# Patient Record
Sex: MALE | Race: WHITE | NOT HISPANIC OR LATINO | Employment: OTHER | URBAN - METROPOLITAN AREA
[De-identification: names, ages, dates, MRNs, and addresses within clinical notes are randomized per-mention and may not be internally consistent; named-entity substitution may affect disease eponyms.]

---

## 2018-08-08 ENCOUNTER — HOSPITAL ENCOUNTER (OUTPATIENT)
Facility: HOSPITAL | Age: 63
Setting detail: OBSERVATION
Discharge: LEFT BEFORE DISCHARGE | End: 2018-08-09
Attending: EMERGENCY MEDICINE | Admitting: INTERNAL MEDICINE

## 2018-08-08 ENCOUNTER — APPOINTMENT (OUTPATIENT)
Dept: CT IMAGING | Facility: HOSPITAL | Age: 63
End: 2018-08-08

## 2018-08-08 ENCOUNTER — APPOINTMENT (OUTPATIENT)
Dept: GENERAL RADIOLOGY | Facility: HOSPITAL | Age: 63
End: 2018-08-08

## 2018-08-08 DIAGNOSIS — N17.9 ACUTE RENAL FAILURE, UNSPECIFIED ACUTE RENAL FAILURE TYPE (HCC): ICD-10-CM

## 2018-08-08 DIAGNOSIS — R41.82 ALTERED MENTAL STATUS, UNSPECIFIED ALTERED MENTAL STATUS TYPE: Primary | ICD-10-CM

## 2018-08-08 DIAGNOSIS — G89.4 CHRONIC PAIN SYNDROME: ICD-10-CM

## 2018-08-08 DIAGNOSIS — E86.0 DEHYDRATION: ICD-10-CM

## 2018-08-08 DIAGNOSIS — D64.9 ANEMIA, UNSPECIFIED TYPE: ICD-10-CM

## 2018-08-08 DIAGNOSIS — I16.0 HYPERTENSIVE URGENCY: ICD-10-CM

## 2018-08-08 LAB
ALBUMIN SERPL-MCNC: 4.48 G/DL (ref 3.2–4.8)
ALBUMIN/GLOB SERPL: 1.2 G/DL (ref 1.5–2.5)
ALP SERPL-CCNC: 54 U/L (ref 25–100)
ALT SERPL W P-5'-P-CCNC: 13 U/L (ref 7–40)
AMPHET+METHAMPHET UR QL: NEGATIVE
AMPHETAMINES UR QL: NEGATIVE
ANION GAP SERPL CALCULATED.3IONS-SCNC: 10 MMOL/L (ref 3–11)
AST SERPL-CCNC: 19 U/L (ref 0–33)
BARBITURATES UR QL SCN: NEGATIVE
BASOPHILS # BLD AUTO: 0.05 10*3/MM3 (ref 0–0.2)
BASOPHILS NFR BLD AUTO: 0.6 % (ref 0–1)
BENZODIAZ UR QL SCN: NEGATIVE
BILIRUB SERPL-MCNC: 0.4 MG/DL (ref 0.3–1.2)
BILIRUB UR QL STRIP: NEGATIVE
BNP SERPL-MCNC: 141 PG/ML (ref 0–100)
BUN BLD-MCNC: 20 MG/DL (ref 9–23)
BUN/CREAT SERPL: 9.6 (ref 7–25)
BUPRENORPHINE SERPL-MCNC: NEGATIVE NG/ML
CALCIUM SPEC-SCNC: 10 MG/DL (ref 8.7–10.4)
CANNABINOIDS SERPL QL: NEGATIVE
CHLORIDE SERPL-SCNC: 113 MMOL/L (ref 99–109)
CLARITY UR: CLEAR
CO2 SERPL-SCNC: 23 MMOL/L (ref 20–31)
COCAINE UR QL: NEGATIVE
COLOR UR: YELLOW
CREAT BLD-MCNC: 2.08 MG/DL (ref 0.6–1.3)
DEPRECATED RDW RBC AUTO: 45.5 FL (ref 37–54)
EOSINOPHIL # BLD AUTO: 0.18 10*3/MM3 (ref 0–0.3)
EOSINOPHIL NFR BLD AUTO: 2 % (ref 0–3)
ERYTHROCYTE [DISTWIDTH] IN BLOOD BY AUTOMATED COUNT: 13.8 % (ref 11.3–14.5)
GFR SERPL CREATININE-BSD FRML MDRD: 32 ML/MIN/1.73
GLOBULIN UR ELPH-MCNC: 3.7 GM/DL
GLUCOSE BLD-MCNC: 85 MG/DL (ref 70–100)
GLUCOSE BLDC GLUCOMTR-MCNC: 86 MG/DL (ref 70–130)
GLUCOSE UR STRIP-MCNC: NEGATIVE MG/DL
HCT VFR BLD AUTO: 31.5 % (ref 38.9–50.9)
HGB BLD-MCNC: 10 G/DL (ref 13.1–17.5)
HGB UR QL STRIP.AUTO: NEGATIVE
HOLD SPECIMEN: NORMAL
HOLD SPECIMEN: NORMAL
IMM GRANULOCYTES # BLD: 0.02 10*3/MM3 (ref 0–0.03)
IMM GRANULOCYTES NFR BLD: 0.2 % (ref 0–0.6)
KETONES UR QL STRIP: NEGATIVE
LEUKOCYTE ESTERASE UR QL STRIP.AUTO: NEGATIVE
LYMPHOCYTES # BLD AUTO: 2.64 10*3/MM3 (ref 0.6–4.8)
LYMPHOCYTES NFR BLD AUTO: 29.1 % (ref 24–44)
MAGNESIUM SERPL-MCNC: 1.8 MG/DL (ref 1.3–2.7)
MCH RBC QN AUTO: 28.3 PG (ref 27–31)
MCHC RBC AUTO-ENTMCNC: 31.7 G/DL (ref 32–36)
MCV RBC AUTO: 89.2 FL (ref 80–99)
METHADONE UR QL SCN: POSITIVE
MONOCYTES # BLD AUTO: 0.8 10*3/MM3 (ref 0–1)
MONOCYTES NFR BLD AUTO: 8.8 % (ref 0–12)
NEUTROPHILS # BLD AUTO: 5.39 10*3/MM3 (ref 1.5–8.3)
NEUTROPHILS NFR BLD AUTO: 59.3 % (ref 41–71)
NITRITE UR QL STRIP: NEGATIVE
OPIATES UR QL: NEGATIVE
OXYCODONE UR QL SCN: POSITIVE
PCP UR QL SCN: NEGATIVE
PH UR STRIP.AUTO: <=5 [PH] (ref 5–8)
PLATELET # BLD AUTO: 196 10*3/MM3 (ref 150–450)
PMV BLD AUTO: 9.7 FL (ref 6–12)
POTASSIUM BLD-SCNC: 4.1 MMOL/L (ref 3.5–5.5)
PROPOXYPH UR QL: NEGATIVE
PROT SERPL-MCNC: 8.2 G/DL (ref 5.7–8.2)
PROT UR QL STRIP: NEGATIVE
RBC # BLD AUTO: 3.53 10*6/MM3 (ref 4.2–5.76)
SODIUM BLD-SCNC: 146 MMOL/L (ref 132–146)
SP GR UR STRIP: 1.01 (ref 1–1.03)
TRICYCLICS UR QL SCN: NEGATIVE
TROPONIN I SERPL-MCNC: 0 NG/ML (ref 0–0.07)
TROPONIN I SERPL-MCNC: 0 NG/ML (ref 0–0.07)
UROBILINOGEN UR QL STRIP: NORMAL
WBC NRBC COR # BLD: 9.08 10*3/MM3 (ref 3.5–10.8)
WHOLE BLOOD HOLD SPECIMEN: NORMAL
WHOLE BLOOD HOLD SPECIMEN: NORMAL

## 2018-08-08 PROCEDURE — 85025 COMPLETE CBC W/AUTO DIFF WBC: CPT

## 2018-08-08 PROCEDURE — 93005 ELECTROCARDIOGRAM TRACING: CPT | Performed by: EMERGENCY MEDICINE

## 2018-08-08 PROCEDURE — 80306 DRUG TEST PRSMV INSTRMNT: CPT | Performed by: PHYSICIAN ASSISTANT

## 2018-08-08 PROCEDURE — 93005 ELECTROCARDIOGRAM TRACING: CPT | Performed by: PHYSICIAN ASSISTANT

## 2018-08-08 PROCEDURE — 71045 X-RAY EXAM CHEST 1 VIEW: CPT

## 2018-08-08 PROCEDURE — 99285 EMERGENCY DEPT VISIT HI MDM: CPT

## 2018-08-08 PROCEDURE — 96374 THER/PROPH/DIAG INJ IV PUSH: CPT

## 2018-08-08 PROCEDURE — 82962 GLUCOSE BLOOD TEST: CPT

## 2018-08-08 PROCEDURE — 25010000002 HYDRALAZINE PER 20 MG: Performed by: PHYSICIAN ASSISTANT

## 2018-08-08 PROCEDURE — 84484 ASSAY OF TROPONIN QUANT: CPT

## 2018-08-08 PROCEDURE — 81003 URINALYSIS AUTO W/O SCOPE: CPT | Performed by: PHYSICIAN ASSISTANT

## 2018-08-08 PROCEDURE — 80053 COMPREHEN METABOLIC PANEL: CPT

## 2018-08-08 PROCEDURE — 96361 HYDRATE IV INFUSION ADD-ON: CPT

## 2018-08-08 PROCEDURE — 83735 ASSAY OF MAGNESIUM: CPT

## 2018-08-08 PROCEDURE — 93005 ELECTROCARDIOGRAM TRACING: CPT

## 2018-08-08 PROCEDURE — 70450 CT HEAD/BRAIN W/O DYE: CPT

## 2018-08-08 PROCEDURE — 83880 ASSAY OF NATRIURETIC PEPTIDE: CPT | Performed by: PHYSICIAN ASSISTANT

## 2018-08-08 RX ORDER — METHADONE HYDROCHLORIDE 10 MG/1
10 TABLET ORAL EVERY 6 HOURS PRN
COMMUNITY

## 2018-08-08 RX ORDER — SODIUM CHLORIDE 0.9 % (FLUSH) 0.9 %
10 SYRINGE (ML) INJECTION AS NEEDED
Status: DISCONTINUED | OUTPATIENT
Start: 2018-08-08 | End: 2018-08-09 | Stop reason: HOSPADM

## 2018-08-08 RX ORDER — LISINOPRIL 20 MG/1
20 TABLET ORAL DAILY
COMMUNITY

## 2018-08-08 RX ORDER — HYDRALAZINE HYDROCHLORIDE 20 MG/ML
10 INJECTION INTRAMUSCULAR; INTRAVENOUS ONCE
Status: COMPLETED | OUTPATIENT
Start: 2018-08-08 | End: 2018-08-08

## 2018-08-08 RX ADMIN — SODIUM CHLORIDE 1000 ML: 9 INJECTION, SOLUTION INTRAVENOUS at 22:00

## 2018-08-08 RX ADMIN — HYDRALAZINE HYDROCHLORIDE 10 MG: 20 INJECTION INTRAMUSCULAR; INTRAVENOUS at 23:55
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
## 2018-08-09 ENCOUNTER — APPOINTMENT (OUTPATIENT)
Dept: MRI IMAGING | Facility: HOSPITAL | Age: 63
End: 2018-08-09

## 2018-08-09 ENCOUNTER — APPOINTMENT (OUTPATIENT)
Dept: GENERAL RADIOLOGY | Facility: HOSPITAL | Age: 63
End: 2018-08-09

## 2018-08-09 VITALS
DIASTOLIC BLOOD PRESSURE: 96 MMHG | RESPIRATION RATE: 18 BRPM | WEIGHT: 174 LBS | HEART RATE: 103 BPM | BODY MASS INDEX: 23.57 KG/M2 | HEIGHT: 72 IN | OXYGEN SATURATION: 95 % | SYSTOLIC BLOOD PRESSURE: 191 MMHG | TEMPERATURE: 98.6 F

## 2018-08-09 PROBLEM — F10.10 ALCOHOL ABUSE: Status: ACTIVE | Noted: 2018-08-09

## 2018-08-09 PROBLEM — F11.90 CHRONIC NARCOTIC USE: Status: ACTIVE | Noted: 2018-08-09

## 2018-08-09 PROBLEM — I10 HTN (HYPERTENSION): Status: ACTIVE | Noted: 2018-08-09

## 2018-08-09 PROBLEM — R41.82 ALTERED MENTAL STATUS: Status: ACTIVE | Noted: 2018-08-09

## 2018-08-09 PROBLEM — G93.40 ENCEPHALOPATHY: Status: ACTIVE | Noted: 2018-08-09

## 2018-08-09 PROBLEM — Z72.0 TOBACCO ABUSE: Status: ACTIVE | Noted: 2018-08-09

## 2018-08-09 PROBLEM — N17.9 ARF (ACUTE RENAL FAILURE) (HCC): Status: ACTIVE | Noted: 2018-08-09

## 2018-08-09 LAB
ANION GAP SERPL CALCULATED.3IONS-SCNC: 8 MMOL/L (ref 3–11)
BUN BLD-MCNC: 19 MG/DL (ref 9–23)
BUN/CREAT SERPL: 10.2 (ref 7–25)
CALCIUM SPEC-SCNC: 9.2 MG/DL (ref 8.7–10.4)
CHLORIDE SERPL-SCNC: 115 MMOL/L (ref 99–109)
CO2 SERPL-SCNC: 22 MMOL/L (ref 20–31)
CREAT BLD-MCNC: 1.86 MG/DL (ref 0.6–1.3)
GFR SERPL CREATININE-BSD FRML MDRD: 37 ML/MIN/1.73
GLUCOSE BLD-MCNC: 82 MG/DL (ref 70–100)
POTASSIUM BLD-SCNC: 4.1 MMOL/L (ref 3.5–5.5)
SODIUM BLD-SCNC: 145 MMOL/L (ref 132–146)
TSH SERPL DL<=0.05 MIU/L-ACNC: 1.17 MIU/ML (ref 0.35–5.35)

## 2018-08-09 PROCEDURE — 84443 ASSAY THYROID STIM HORMONE: CPT | Performed by: INTERNAL MEDICINE

## 2018-08-09 PROCEDURE — 70551 MRI BRAIN STEM W/O DYE: CPT

## 2018-08-09 PROCEDURE — G0378 HOSPITAL OBSERVATION PER HR: HCPCS

## 2018-08-09 PROCEDURE — 80048 BASIC METABOLIC PNL TOTAL CA: CPT | Performed by: INTERNAL MEDICINE

## 2018-08-09 PROCEDURE — 99220 PR INITIAL OBSERVATION CARE/DAY 70 MINUTES: CPT | Performed by: INTERNAL MEDICINE

## 2018-08-09 PROCEDURE — 74018 RADEX ABDOMEN 1 VIEW: CPT

## 2018-08-09 RX ORDER — CLOPIDOGREL BISULFATE 75 MG/1
75 TABLET ORAL DAILY
Status: ON HOLD | COMMUNITY
End: 2019-09-24

## 2018-08-09 RX ORDER — HEPARIN SODIUM 5000 [USP'U]/ML
5000 INJECTION, SOLUTION INTRAVENOUS; SUBCUTANEOUS EVERY 8 HOURS SCHEDULED
Status: DISCONTINUED | OUTPATIENT
Start: 2018-08-09 | End: 2018-08-09 | Stop reason: HOSPADM

## 2018-08-09 RX ORDER — RANITIDINE 150 MG/1
300 TABLET ORAL DAILY
COMMUNITY
End: 2019-09-23

## 2018-08-09 RX ORDER — SODIUM CHLORIDE 9 MG/ML
100 INJECTION, SOLUTION INTRAVENOUS CONTINUOUS
Status: DISCONTINUED | OUTPATIENT
Start: 2018-08-09 | End: 2018-08-09

## 2018-08-09 RX ORDER — SODIUM CHLORIDE 0.9 % (FLUSH) 0.9 %
1-10 SYRINGE (ML) INJECTION AS NEEDED
Status: DISCONTINUED | OUTPATIENT
Start: 2018-08-09 | End: 2018-08-09 | Stop reason: HOSPADM

## 2018-08-09 RX ORDER — METHADONE HYDROCHLORIDE 10 MG/1
10 TABLET ORAL EVERY 6 HOURS PRN
Status: DISCONTINUED | OUTPATIENT
Start: 2018-08-09 | End: 2018-08-09 | Stop reason: HOSPADM

## 2018-08-09 RX ORDER — OXYCODONE AND ACETAMINOPHEN 10; 325 MG/1; MG/1
1 TABLET ORAL EVERY 8 HOURS PRN
COMMUNITY

## 2018-08-09 RX ORDER — TAMSULOSIN HYDROCHLORIDE 0.4 MG/1
1 CAPSULE ORAL NIGHTLY
COMMUNITY

## 2018-08-09 RX ADMIN — METOPROLOL TARTRATE 12.5 MG: 25 TABLET, FILM COATED ORAL at 09:40

## 2018-08-09 NOTE — PROGRESS NOTES
Mr. Elizabeth was seen and examined in his room today. H&P was reviewed from overnight. Patient's ROBLES was also reviewed which documents filling methadone 10mg q4h for a 30 day supply on 8/3/18. Given this, will restart methadone. Patient also noted to have filled klonopin and oxy/APAP both for 30 day supplies on 8/3, will hold these medications at this time as patient still with encephalopathy. Supsect he is withdrawing at this time and will place him on [facility] protocol for monitoring of this withdraw. Of note, patient appears dry however is refusing IVF at this time, so will discontinue. Will continue to monitor kidney function with daily BMP.

## 2018-08-09 NOTE — PROGRESS NOTES
"Discharge Planning Assessment  [facility]     Patient Name: Patrick Elizabeth  MRN: [id number]  Today's Date: 8/9/2018    Admit Date: 8/8/2018          Discharge Needs Assessment     Row Name 08/09/18 1214       Living Environment    Lives With alone    Current Living Arrangements home/apartment/condo    Primary Care Provided by self    Provides Primary Care For no one, unable/limited ability to care for self    Family Caregiver if Needed none    Quality of Family Relationships unable to assess    Able to Return to Prior Arrangements other (see comments)       Resource/Environmental Concerns    Resource/Environmental Concerns other (see comments)    Transportation Concerns car, none       Transition Planning    Patient/Family Anticipates Transition to home    Patient/Family Anticipated Services at Transition none    Transportation Anticipated family or friend will provide       Discharge Needs Assessment    Readmission Within the Last 30 Days no previous admission in last 30 days    Concerns to be Addressed discharge planning    Equipment Currently Used at Home none    Anticipated Changes Related to Illness none    Equipment Needed After Discharge none    Discharge Facility/Level of Care Needs home with home health    Offered/Gave Vendor List yes            Discharge Plan     Row Name 08/09/18 1210       Plan    Plan IDP    Patient/Family in Agreement with Plan yes    Plan Comments Pt is confused during IDP. No family or friends present in room. IDP information gather from pt. Pt lives in [facility] and states he has friends to bring him home at d/c.  However, he does not have a phone number listed for EC. When asked for a EC phone number he stated \"its in my phone\". Pt states he uses no HH, PT/OT, or DME.  Pt states his PCP is Dr Asad Hudson and he sees Niki Chandler for pain control. Pt states his medications are covered by insurance but didn't know if he had plan D w Medicare.  Pt has been threatning to leave " "AMA per nursing staff. Pt may need HH at d/c. He stated he didn't want this. CM went over a list of HH companies. Pt did not respond. SW is aware of pt. Pt goal is to return home w friends (may need to use alternate transportation) possibly w HH if he agrees and it is medically needed. CM will continue to follow.        Destination     No service coordination in this encounter.      Durable Medical Equipment     No service coordination in this encounter.      Dialysis/Infusion     No service coordination in this encounter.      Home Medical Care     No service coordination in this encounter.      Social Care     No service coordination in this encounter.        Expected Discharge Date and Time     Expected Discharge Date Expected Discharge Time    Aug 11, 2018               Demographic Summary     Row Name 08/09/18 1212       General Information    Admission Type observation    Arrived From home    Referral Source admission list    Reason for Consult discharge planning    Preferred Language English       Contact Information    Permission Granted to Share Info With ;family/designee    Contact Information Comments Pt has no listed EC. When asked he stated he had \"friends to call\" but didn't have numbers.             Functional Status     Row Name 08/09/18 1213       Functional Status    Usual Activity Tolerance moderate       Functional Status, IADL    Medications independent    Meal Preparation independent    Housekeeping independent    Laundry independent    Shopping independent       Mental Status Summary    Recent Changes in Mental Status/Cognitive Functioning unable to assess            Psychosocial    No documentation.           Abuse/Neglect    No documentation.           Legal    No documentation.           Substance Abuse    No documentation.           Patient Forms    No documentation.         Lauren Alanis RN    "

## 2018-08-09 NOTE — NURSING NOTE
"Patient came up to nursing station asking to leave against medical advice. This nurse educated patient on needing to stay due to uncontrolled hypertension, patient stated he needed to leave he \" needs his pills\" and \"we werent giving him any\". Patient signed paperwork and stated he was going to wait downstairs for a ride. Patient left in stable condition. Primary RN Samia notified and MD Patterson notified in rounds that patient may leave AMA.   "
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
## 2019-09-12 ENCOUNTER — OFFICE VISIT (OUTPATIENT)
Dept: CARDIAC SURGERY | Facility: CLINIC | Age: 64
End: 2019-09-12

## 2019-09-12 VITALS
BODY MASS INDEX: 23.57 KG/M2 | HEIGHT: 72 IN | WEIGHT: 174 LBS | OXYGEN SATURATION: 98 % | SYSTOLIC BLOOD PRESSURE: 140 MMHG | DIASTOLIC BLOOD PRESSURE: 70 MMHG | HEART RATE: 105 BPM

## 2019-09-12 DIAGNOSIS — I73.9 PERIPHERAL ARTERIAL DISEASE (HCC): Primary | ICD-10-CM

## 2019-09-12 PROCEDURE — 99205 OFFICE O/P NEW HI 60 MIN: CPT | Performed by: THORACIC SURGERY (CARDIOTHORACIC VASCULAR SURGERY)

## 2019-09-12 RX ORDER — PRAVASTATIN SODIUM 20 MG
20 TABLET ORAL DAILY
COMMUNITY
Start: 2019-07-29

## 2019-09-12 RX ORDER — CLONAZEPAM 1 MG/1
TABLET ORAL 3 TIMES DAILY
COMMUNITY
Start: 2018-06-28
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
## 2019-09-13 DIAGNOSIS — I70.219 ATHEROSCLEROTIC PVD WITH INTERMITTENT CLAUDICATION (HCC): Primary | ICD-10-CM

## 2019-09-13 PROBLEM — I73.9 PERIPHERAL ARTERIAL DISEASE (HCC): Status: ACTIVE | Noted: 2019-09-13

## 2019-09-16 ENCOUNTER — PREP FOR SURGERY (OUTPATIENT)
Dept: OTHER | Facility: HOSPITAL | Age: 64
End: 2019-09-16

## 2019-09-16 DIAGNOSIS — I73.9 PVD (PERIPHERAL VASCULAR DISEASE) (HCC): Primary | ICD-10-CM

## 2019-09-23 ENCOUNTER — ANESTHESIA EVENT (OUTPATIENT)
Dept: PERIOP | Facility: HOSPITAL | Age: 64
End: 2019-09-23

## 2019-09-23 ENCOUNTER — APPOINTMENT (OUTPATIENT)
Dept: PREADMISSION TESTING | Facility: HOSPITAL | Age: 64
End: 2019-09-23

## 2019-09-23 VITALS — WEIGHT: 175.6 LBS | BODY MASS INDEX: 23.78 KG/M2 | HEIGHT: 72 IN

## 2019-09-23 DIAGNOSIS — I73.9 PVD (PERIPHERAL VASCULAR DISEASE) (HCC): ICD-10-CM

## 2019-09-23 LAB
ANION GAP SERPL CALCULATED.3IONS-SCNC: 12 MMOL/L (ref 5–15)
APTT PPP: 35.3 SECONDS (ref 24–37)
BUN BLD-MCNC: 16 MG/DL (ref 8–23)
BUN/CREAT SERPL: 15.5 (ref 7–25)
CALCIUM SPEC-SCNC: 9.8 MG/DL (ref 8.6–10.5)
CHLORIDE SERPL-SCNC: 101 MMOL/L (ref 98–107)
CO2 SERPL-SCNC: 24 MMOL/L (ref 22–29)
CREAT BLD-MCNC: 1.03 MG/DL (ref 0.76–1.27)
DEPRECATED RDW RBC AUTO: 47.7 FL (ref 37–54)
ERYTHROCYTE [DISTWIDTH] IN BLOOD BY AUTOMATED COUNT: 13.9 % (ref 12.3–15.4)
GFR SERPL CREATININE-BSD FRML MDRD: 72 ML/MIN/1.73
GLUCOSE BLD-MCNC: 104 MG/DL (ref 65–99)
HBA1C MFR BLD: 5.2 % (ref 4.8–5.6)
HCT VFR BLD AUTO: 35.6 % (ref 37.5–51)
HGB BLD-MCNC: 11.3 G/DL (ref 13–17.7)
INR PPP: 1.12 (ref 0.85–1.16)
MCH RBC QN AUTO: 29.4 PG (ref 26.6–33)
MCHC RBC AUTO-ENTMCNC: 31.7 G/DL (ref 31.5–35.7)
MCV RBC AUTO: 92.5 FL (ref 79–97)
PLATELET # BLD AUTO: 242 10*3/MM3 (ref 140–450)
PMV BLD AUTO: 9.8 FL (ref 6–12)
POTASSIUM BLD-SCNC: 4.8 MMOL/L (ref 3.5–5.2)
PROTHROMBIN TIME: 13.9 SECONDS (ref 11.2–14.3)
RBC # BLD AUTO: 3.85 10*6/MM3 (ref 4.14–5.8)
SODIUM BLD-SCNC: 137 MMOL/L (ref 136–145)
WBC NRBC COR # BLD: 9.23 10*3/MM3 (ref 3.4–10.8)

## 2019-09-23 PROCEDURE — 85027 COMPLETE CBC AUTOMATED: CPT | Performed by: PHYSICIAN ASSISTANT

## 2019-09-23 PROCEDURE — 80048 BASIC METABOLIC PNL TOTAL CA: CPT | Performed by: PHYSICIAN ASSISTANT

## 2019-09-23 PROCEDURE — 85610 PROTHROMBIN TIME: CPT | Performed by: PHYSICIAN ASSISTANT

## 2019-09-23 PROCEDURE — 36415 COLL VENOUS BLD VENIPUNCTURE: CPT

## 2019-09-23 PROCEDURE — 85730 THROMBOPLASTIN TIME PARTIAL: CPT | Performed by: PHYSICIAN ASSISTANT

## 2019-09-23 PROCEDURE — 83036 HEMOGLOBIN GLYCOSYLATED A1C: CPT | Performed by: PHYSICIAN ASSISTANT

## 2019-09-23 RX ORDER — FAMOTIDINE 10 MG/ML
20 INJECTION, SOLUTION INTRAVENOUS ONCE
Status: CANCELLED | OUTPATIENT
Start: 2019-09-23 | End: 2019-09-23

## 2019-09-23 NOTE — PAT
Patient to apply Chlorhexadine wipes  to surgical area (as instructed) the night before procedure and the AM of procedure. Wipes provided.    Per Anesthesia Request, patient instructed not to take their ACE/ARB medications on the AM of surgery.

## 2019-09-24 ENCOUNTER — APPOINTMENT (OUTPATIENT)
Dept: GENERAL RADIOLOGY | Facility: HOSPITAL | Age: 64
End: 2019-09-24

## 2019-09-24 ENCOUNTER — HOSPITAL ENCOUNTER (OUTPATIENT)
Facility: HOSPITAL | Age: 64
Discharge: HOME | End: 2019-09-24
Attending: THORACIC SURGERY (CARDIOTHORACIC VASCULAR SURGERY) | Admitting: THORACIC SURGERY (CARDIOTHORACIC VASCULAR SURGERY)

## 2019-09-24 ENCOUNTER — ANESTHESIA (OUTPATIENT)
Dept: PERIOP | Facility: HOSPITAL | Age: 64
End: 2019-09-24

## 2019-09-24 VITALS
TEMPERATURE: 97.8 F | SYSTOLIC BLOOD PRESSURE: 140 MMHG | RESPIRATION RATE: 18 BRPM | HEART RATE: 105 BPM | DIASTOLIC BLOOD PRESSURE: 71 MMHG | OXYGEN SATURATION: 95 %

## 2019-09-24 DIAGNOSIS — I73.9 PVD (PERIPHERAL VASCULAR DISEASE) (HCC): ICD-10-CM

## 2019-09-24 PROCEDURE — 75716 ARTERY X-RAYS ARMS/LEGS: CPT | Performed by: THORACIC SURGERY (CARDIOTHORACIC VASCULAR SURGERY)

## 2019-09-24 PROCEDURE — 25010000002 HEPARIN (PORCINE) PER 1000 UNITS: Performed by: THORACIC SURGERY (CARDIOTHORACIC VASCULAR SURGERY)

## 2019-09-24 PROCEDURE — C1769 GUIDE WIRE: HCPCS | Performed by: THORACIC SURGERY (CARDIOTHORACIC VASCULAR SURGERY)

## 2019-09-24 PROCEDURE — 25010000002 FENTANYL CITRATE (PF) 100 MCG/2ML SOLUTION: Performed by: NURSE ANESTHETIST, CERTIFIED REGISTERED

## 2019-09-24 PROCEDURE — A9270 NON-COVERED ITEM OR SERVICE: HCPCS | Performed by: THORACIC SURGERY (CARDIOTHORACIC VASCULAR SURGERY)

## 2019-09-24 PROCEDURE — C1887 CATHETER, GUIDING: HCPCS | Performed by: THORACIC SURGERY (CARDIOTHORACIC VASCULAR SURGERY)

## 2019-09-24 PROCEDURE — 25010000002 PROPOFOL 10 MG/ML EMULSION: Performed by: NURSE ANESTHETIST, CERTIFIED REGISTERED

## 2019-09-24 PROCEDURE — 36200 PLACE CATHETER IN AORTA: CPT | Performed by: THORACIC SURGERY (CARDIOTHORACIC VASCULAR SURGERY)

## 2019-09-24 PROCEDURE — 25010000003 LIDOCAINE 1 % SOLUTION: Performed by: THORACIC SURGERY (CARDIOTHORACIC VASCULAR SURGERY)

## 2019-09-24 PROCEDURE — 25010000002 HYDRALAZINE PER 20 MG: Performed by: NURSE ANESTHETIST, CERTIFIED REGISTERED

## 2019-09-24 PROCEDURE — 75605 CONTRAST EXAM THORACIC AORTA: CPT

## 2019-09-24 PROCEDURE — 75716 ARTERY X-RAYS ARMS/LEGS: CPT

## 2019-09-24 PROCEDURE — 63710000001 NICOTINE 14 MG/24HR PATCH 24 HOUR: Performed by: THORACIC SURGERY (CARDIOTHORACIC VASCULAR SURGERY)

## 2019-09-24 PROCEDURE — 0 IODIXANOL PER 1 ML: Performed by: THORACIC SURGERY (CARDIOTHORACIC VASCULAR SURGERY)

## 2019-09-24 PROCEDURE — C1894 INTRO/SHEATH, NON-LASER: HCPCS | Performed by: THORACIC SURGERY (CARDIOTHORACIC VASCULAR SURGERY)

## 2019-09-24 PROCEDURE — 25010000002 ONDANSETRON PER 1 MG: Performed by: NURSE ANESTHETIST, CERTIFIED REGISTERED

## 2019-09-24 PROCEDURE — 75625 CONTRAST EXAM ABDOMINL AORTA: CPT

## 2019-09-24 PROCEDURE — 75625 CONTRAST EXAM ABDOMINL AORTA: CPT | Performed by: THORACIC SURGERY (CARDIOTHORACIC VASCULAR SURGERY)

## 2019-09-24 RX ORDER — PROPOFOL 10 MG/ML
VIAL (ML) INTRAVENOUS AS NEEDED
Status: DISCONTINUED | OUTPATIENT
Start: 2019-09-24 | End: 2019-09-24 | Stop reason: SURG

## 2019-09-24 RX ORDER — MEPERIDINE HYDROCHLORIDE 25 MG/ML
12.5 INJECTION INTRAMUSCULAR; INTRAVENOUS; SUBCUTANEOUS
Status: DISCONTINUED | OUTPATIENT
Start: 2019-09-24 | End: 2019-09-24 | Stop reason: HOSPADM

## 2019-09-24 RX ORDER — IPRATROPIUM BROMIDE AND ALBUTEROL SULFATE 2.5; .5 MG/3ML; MG/3ML
3 SOLUTION RESPIRATORY (INHALATION) ONCE AS NEEDED
Status: DISCONTINUED | OUTPATIENT
Start: 2019-09-24 | End: 2019-09-24 | Stop reason: HOSPADM

## 2019-09-24 RX ORDER — SODIUM CHLORIDE 0.9 % (FLUSH) 0.9 %
3 SYRINGE (ML) INJECTION EVERY 12 HOURS SCHEDULED
Status: DISCONTINUED | OUTPATIENT
Start: 2019-09-24 | End: 2019-09-24 | Stop reason: HOSPADM

## 2019-09-24 RX ORDER — LIDOCAINE HYDROCHLORIDE 10 MG/ML
0.5 INJECTION, SOLUTION EPIDURAL; INFILTRATION; INTRACAUDAL; PERINEURAL ONCE AS NEEDED
Status: COMPLETED | OUTPATIENT
Start: 2019-09-24 | End: 2019-09-24

## 2019-09-24 RX ORDER — SODIUM CHLORIDE 450 MG/100ML
100 INJECTION, SOLUTION INTRAVENOUS CONTINUOUS
Status: CANCELLED | OUTPATIENT
Start: 2019-09-24

## 2019-09-24 RX ORDER — FENTANYL CITRATE 50 UG/ML
INJECTION, SOLUTION INTRAMUSCULAR; INTRAVENOUS AS NEEDED
Status: DISCONTINUED | OUTPATIENT
Start: 2019-09-24 | End: 2019-09-24 | Stop reason: SURG

## 2019-09-24 RX ORDER — PROMETHAZINE HYDROCHLORIDE 25 MG/1
25 TABLET ORAL ONCE AS NEEDED
Status: DISCONTINUED | OUTPATIENT
Start: 2019-09-24 | End: 2019-09-24 | Stop reason: HOSPADM

## 2019-09-24 RX ORDER — HYDROMORPHONE HYDROCHLORIDE 1 MG/ML
0.5 INJECTION, SOLUTION INTRAMUSCULAR; INTRAVENOUS; SUBCUTANEOUS
Status: DISCONTINUED | OUTPATIENT
Start: 2019-09-24 | End: 2019-09-24 | Stop reason: HOSPADM

## 2019-09-24 RX ORDER — HYDRALAZINE HYDROCHLORIDE 20 MG/ML
5 INJECTION INTRAMUSCULAR; INTRAVENOUS
Status: DISCONTINUED | OUTPATIENT
Start: 2019-09-24 | End: 2019-09-24 | Stop reason: HOSPADM

## 2019-09-24 RX ORDER — PROMETHAZINE HYDROCHLORIDE 25 MG/ML
6.25 INJECTION, SOLUTION INTRAMUSCULAR; INTRAVENOUS ONCE AS NEEDED
Status: DISCONTINUED | OUTPATIENT
Start: 2019-09-24 | End: 2019-09-24 | Stop reason: HOSPADM

## 2019-09-24 RX ORDER — NICOTINE 21 MG/24HR
1 PATCH, TRANSDERMAL 24 HOURS TRANSDERMAL ONCE
Status: DISCONTINUED | OUTPATIENT
Start: 2019-09-24 | End: 2019-09-24 | Stop reason: HOSPADM

## 2019-09-24 RX ORDER — IODIXANOL 320 MG/ML
INJECTION, SOLUTION INTRAVASCULAR AS NEEDED
Status: DISCONTINUED | OUTPATIENT
Start: 2019-09-24 | End: 2019-09-24 | Stop reason: HOSPADM

## 2019-09-24 RX ORDER — SODIUM CHLORIDE 0.9 % (FLUSH) 0.9 %
3-10 SYRINGE (ML) INJECTION AS NEEDED
Status: DISCONTINUED | OUTPATIENT
Start: 2019-09-24 | End: 2019-09-24 | Stop reason: HOSPADM

## 2019-09-24 RX ORDER — FENTANYL CITRATE 50 UG/ML
50 INJECTION, SOLUTION INTRAMUSCULAR; INTRAVENOUS
Status: DISCONTINUED | OUTPATIENT
Start: 2019-09-24 | End: 2019-09-24 | Stop reason: HOSPADM

## 2019-09-24 RX ORDER — LABETALOL HYDROCHLORIDE 5 MG/ML
5 INJECTION, SOLUTION INTRAVENOUS
Status: DISCONTINUED | OUTPATIENT
Start: 2019-09-24 | End: 2019-09-24 | Stop reason: HOSPADM

## 2019-09-24 RX ORDER — LIDOCAINE HYDROCHLORIDE 10 MG/ML
INJECTION, SOLUTION INFILTRATION; PERINEURAL AS NEEDED
Status: DISCONTINUED | OUTPATIENT
Start: 2019-09-24 | End: 2019-09-24 | Stop reason: HOSPADM

## 2019-09-24 RX ORDER — ONDANSETRON 2 MG/ML
4 INJECTION INTRAMUSCULAR; INTRAVENOUS ONCE AS NEEDED
Status: COMPLETED | OUTPATIENT
Start: 2019-09-24 | End: 2019-09-24

## 2019-09-24 RX ORDER — HYDROCODONE BITARTRATE AND ACETAMINOPHEN 5; 325 MG/1; MG/1
1 TABLET ORAL ONCE AS NEEDED
Status: DISCONTINUED | OUTPATIENT
Start: 2019-09-24 | End: 2019-09-24 | Stop reason: HOSPADM

## 2019-09-24 RX ORDER — FAMOTIDINE 20 MG/1
20 TABLET, FILM COATED ORAL ONCE
Status: COMPLETED | OUTPATIENT
Start: 2019-09-24 | End: 2019-09-24

## 2019-09-24 RX ORDER — SODIUM CHLORIDE, SODIUM LACTATE, POTASSIUM CHLORIDE, CALCIUM CHLORIDE 600; 310; 30; 20 MG/100ML; MG/100ML; MG/100ML; MG/100ML
9 INJECTION, SOLUTION INTRAVENOUS CONTINUOUS
Status: DISCONTINUED | OUTPATIENT
Start: 2019-09-24 | End: 2019-09-24 | Stop reason: HOSPADM

## 2019-09-24 RX ORDER — NALOXONE HCL 0.4 MG/ML
0.4 VIAL (ML) INJECTION AS NEEDED
Status: DISCONTINUED | OUTPATIENT
Start: 2019-09-24 | End: 2019-09-24 | Stop reason: HOSPADM

## 2019-09-24 RX ORDER — PROMETHAZINE HYDROCHLORIDE 25 MG/1
25 SUPPOSITORY RECTAL ONCE AS NEEDED
Status: DISCONTINUED | OUTPATIENT
Start: 2019-09-24 | End: 2019-09-24 | Stop reason: HOSPADM

## 2019-09-24 RX ADMIN — HYDRALAZINE HYDROCHLORIDE 5 MG: 20 INJECTION INTRAMUSCULAR; INTRAVENOUS at 11:06

## 2019-09-24 RX ADMIN — NICOTINE 1 PATCH: 14 PATCH, EXTENDED RELEASE TRANSDERMAL at 14:03

## 2019-09-24 RX ADMIN — ONDANSETRON 4 MG: 2 INJECTION INTRAMUSCULAR; INTRAVENOUS at 11:46

## 2019-09-24 RX ADMIN — PROPOFOL 100 MCG/KG/MIN: 10 INJECTION, EMULSION INTRAVENOUS at 09:45

## 2019-09-24 RX ADMIN — FENTANYL CITRATE 50 MCG: 50 INJECTION, SOLUTION INTRAMUSCULAR; INTRAVENOUS at 09:45

## 2019-09-24 RX ADMIN — FENTANYL CITRATE 25 MCG: 50 INJECTION, SOLUTION INTRAMUSCULAR; INTRAVENOUS at 09:49

## 2019-09-24 RX ADMIN — HYDRALAZINE HYDROCHLORIDE 5 MG: 20 INJECTION INTRAMUSCULAR; INTRAVENOUS at 10:53

## 2019-09-24 RX ADMIN — SODIUM CHLORIDE, POTASSIUM CHLORIDE, SODIUM LACTATE AND CALCIUM CHLORIDE 9 ML/HR: 600; 310; 30; 20 INJECTION, SOLUTION INTRAVENOUS at 08:20

## 2019-09-24 RX ADMIN — LIDOCAINE HYDROCHLORIDE 0.5 ML: 10 INJECTION, SOLUTION EPIDURAL; INFILTRATION; INTRACAUDAL; PERINEURAL at 08:20

## 2019-09-24 RX ADMIN — PROPOFOL 50 MG: 10 INJECTION, EMULSION INTRAVENOUS at 09:45

## 2019-09-24 RX ADMIN — FENTANYL CITRATE 25 MCG: 50 INJECTION, SOLUTION INTRAMUSCULAR; INTRAVENOUS at 09:42

## 2019-09-24 RX ADMIN — FAMOTIDINE 20 MG: 20 TABLET ORAL at 08:19

## 2019-09-24 NOTE — NURSING NOTE
Hydralazine administered to lower patient's blood pressure from the 150s to <140 for left groin sheath removal. Pressure decreasing at this time. Currently 145/75. Will recheck in 5 minutes to monitor and will remove as soon as possible.

## 2019-09-24 NOTE — OP NOTE
Operative Report    Preop Diagnosis: Peripheral arterial atherosclerotic vascular disease with claudication.  History of previous iliac artery stents elsewhere.          Procedure: Catheter in the left femoral artery.  Catheter in aorta.  Aortogram.  Repositioning of the catheter below the renal arteries graphic runoff of both lower extremity vasculature        Surgeons: Valentín Ewing MD surgeon.  Dannie Enciso PA-C Assistant        Indication: This patient was referred to me with severe bilateral lower extremity claudication and ongoing tobacco abuse.  He had iliac stents placed in a number of years ago at another facility he also had a CT angiogram significant bilateral peripheral arterial disease.  With both narrowing and the previously placed stents as well as the external iliac arteries and the popliteal arteries        Description: Supine position sterile prep and drape left femoral artery was percutaneously cannulated.  An 035 guide was placed followed by a 4 Peruv[place] sheath and a pigtail catheter in the suprarenal position.  Aortogram demonstrated the renal arteries were single and patent bilaterally but extremely small.  The aorta itself had no significant narrowing.  The common iliac arteries had bilateral stents which appeared to be widely patent without stenosis.  I then repositioned the catheter below the renal arteries for complete arteriographic runoff.  Again the iliac arteries in the region of the stents were widely patent without stenosis and this was in contradistinction to the CT scan report.  Similarly the external iliacs and common femorals are widely patent bilaterally without stenosis.  The superficial femoral arteries and popliteal arteries were widely patent without narrowing and this again disagreed with the CT scan.  The trifurcation vessels were normal in appearance and takeoff with three-vessel runoff bilaterally to the feet and ankle.  The overall final impression is there is absolutely  no hemodynamically significant vascular disease.  Because of this patient's extreme complaints and symptomatology I positioned the catheter mid descending thoracic aorta to be certain there was no narrowing elsewhere in the aorta that could contribute to this patient's symptomatology.  And arteriographic run from the mid descending thoracic aorta to the iliac arteries demonstrated no evidence of aortic stenosis.  We then performed of the left anterior oblique view to again look at the iliac arteries in the region of the old stents and again there was no evidence of significant stenosis.  Total contrast given 170 mL.  Total fluoroscopy time 3 minutes.      Please note that portions of this note were completed with a voice recognition program. Efforts were made to edit the dictations, but occasionally words are mistranscribed.

## 2019-09-24 NOTE — NURSING NOTE
Patient transferred to 407 in [facility] at 1150. Nausea resolved. Patient groin site and pulses checked with MAYUR Partida at bedside. Soft and dry with CDI tegaderm. Sand bag still in place. Patient complaining of slight back pain which he said he resolves as home by sitting up and with his pain clinic. Patient attempting to use urinal at this time. Friend who came with him and will drive him home is with him in his room. VSS

## 2019-09-24 NOTE — NURSING NOTE
Patient discharged from [facility] via wheelchair. Patient denies pain, feeling much better being able to get up, was able to ambulate with standby assist in the [facility] at 1520. Pt verbalizes understanding of discharge instructions, and has paper copy. Vital signs stable throughout recovery, pt tolerating PO's, voiding spontaneously. Pt did request a nicotine patch, he has used them quite a bit in the past and verbalized understanding of the importance of not smoking immediately upon leaving the hospital. Pt has no questions at this time and will follow up with family doctor.

## 2019-09-24 NOTE — ANESTHESIA POSTPROCEDURE EVALUATION
Patient: Patrick Elizabeth    Procedure Summary     Date:  09/24/19 Room / Location:   [facility] /  [facility]    Anesthesia Start:  0940 Anesthesia Stop:  1021    Procedure:  ABDOMINAL AORTAGRAM WITH PERIPHERAL  RUNOFFS (N/A Abdomen) Diagnosis:       Atherosclerotic PVD with intermittent claudication (CMS/HCC)      (Atherosclerotic PVD with intermittent claudication (CMS/HCC) [I70.219])    Surgeon:  Valentín Ewing MD Provider:  Kody Landa MD    Anesthesia Type:  MAC ASA Status:  3          Anesthesia Type: MAC  Last vitals  BP   155/81   Temp 97   Pulse 73   Resp 18   SpO2 97%     Post Anesthesia Care and Evaluation    Patient location during evaluation: PACU  Patient participation: complete - patient participated  Level of consciousness: awake  Pain score: 0  Pain management: adequate  Airway patency: patent  Anesthetic complications: No anesthetic complications  PONV Status: none  Cardiovascular status: hemodynamically stable and acceptable  Respiratory status: nonlabored ventilation, acceptable and nasal cannula  Hydration status: acceptable

## 2019-09-24 NOTE — INTERVAL H&P NOTE
Pre-Op H&P (See Recent Office Note Attached for Full H&P)    Chief complaint: +BLE claudication    Review of Systems:  General ROS:  no fever, chills, rashes, No change since last office visit  Cardiovascular ROS: no chest pain or dyspnea on exertion.   Respiratory ROS: +baseline cough, shortness of breath, or wheezing.   +COPD    Meds:    No current facility-administered medications on file prior to encounter.      Current Outpatient Medications on File Prior to Encounter   Medication Sig Dispense Refill   • Budesonide-Formoterol Fumarate (SYMBICORT IN) Inhale 2 (Two) Times a Day.     • clonazePAM (KLONOPIN) 1 MG tablet 3 (Three) Times a Day.     • lisinopril (PRINIVIL,ZESTRIL) 20 MG tablet Take 20 mg by mouth Daily.     • methadone (DOLOPHINE) 10 MG tablet Take 10 mg by mouth Every 6 (Six) Hours As Needed for Moderate Pain ,     • oxyCODONE-acetaminophen (PERCOCET)  MG per tablet Take 1 tablet by mouth Every 8 (Eight) Hours As Needed for Moderate Pain .     • pravastatin (PRAVACHOL) 20 MG tablet Take 20 mg by mouth Daily.     • tamsulosin (FLOMAX) 0.4 MG capsule 24 hr capsule Take 1 capsule by mouth Every Night.     • [DISCONTINUED] clopidogrel (PLAVIX) 75 MG tablet Take 75 mg by mouth Daily.         Vital Signs:  /83 (BP Location: Right arm, Patient Position: Lying)   Pulse 79   Temp 97.1 °F (36.2 °C) (Temporal)   Resp 18   SpO2 98%     Physical Exam:    CV:  S1S2 regular rate and rhythm, no murmur               Resp:  Clear to auscultation; respirations regular, even and unlabored    Results Review:     Lab Results   Component Value Date    WBC 9.23 09/23/2019    HGB 11.3 (L) 09/23/2019    HCT 35.6 (L) 09/23/2019    MCV 92.5 09/23/2019     09/23/2019    NEUTROABS 5.39 08/08/2018    GLUCOSE 104 (H) 09/23/2019    BUN 16 09/23/2019    CREATININE 1.03 09/23/2019    EGFRIFNONA 72 09/23/2019     09/23/2019    K 4.8 09/23/2019     09/23/2019    CO2 24.0 09/23/2019    MG 1.8 08/08/2018     CALCIUM 9.8 09/23/2019    ALBUMIN 4.48 08/08/2018    AST 19 08/08/2018    ALT 13 08/08/2018    BILITOT 0.4 08/08/2018        I reviewed the patient's new clinical results.    Assessment/Plan:    This patient has a number of medical problems including chronic alcohol abuse as well as ongoing tobacco abuse.  He has had bilateral iliac artery stents in the past and now there appears to be an in-stent stenosis bilaterally by CT scan.  The more worrisome finding is that this patient has probable renal cell cancer, for which he refuses any treatment at this time.  At this time, his only concern is that he has severe leg pain and he says he cannot walk and that sometimes walking even around his apartment is difficult.  I do not believe he is a candidate for any major open revascularization secondary to his probable renal cell cancer and refusal to seek therapy.  However, I may be able to provide him palliative relief of his severe leg pain with catheter-based intervention.  He is agreeable to proceed.  He understands there is a risk of contrast reaction and nephrotoxicity.  He indicates that he will not stop smoking.  We will attempt catheter-based intervention on one or both of the iliac arteries with a full aortogram and runoff to assess his vasculature.  He is agreeable to proceed.  As above.  For catheter-based intervention today based upon arteriographic findings.  Risks of nephrotoxicity contrast reaction were explained.    Angely Vasquez, APRN  9/24/2019   8:43 AM

## 2019-09-24 NOTE — ANESTHESIA PREPROCEDURE EVALUATION
Anesthesia Evaluation     Patient summary reviewed and Nursing notes reviewed   NPO Solid Status: > 4 hours  NPO Liquid Status: > 6 hours           Airway   Mallampati: I  TM distance: >3 FB  Neck ROM: full  No difficulty expected  Dental      Pulmonary     breath sounds clear to auscultation  (+) COPD,   Cardiovascular     Rhythm: regular  Rate: normal    (+) hypertension, PVD, hyperlipidemia,       Neuro/Psych  (+) TIA,     GI/Hepatic/Renal/Endo      Musculoskeletal     Abdominal    Substance History   (+) alcohol use,      OB/GYN          Other   (+) arthritis                     Anesthesia Plan    ASA 3     MAC     intravenous induction   Anesthetic plan, all risks, benefits, and alternatives have been provided, discussed and informed consent has been obtained with: patient.    Plan discussed with CRNA.

## (undated) DEVICE — GLV SURG BIOGEL LTX PF 8

## (undated) DEVICE — CATH GUIDE SOFTVU FLUSH HT PIG .035 4F 65CM

## (undated) DEVICE — DRSNG SURESITE WNDW 4X4.5

## (undated) DEVICE — TBG INJ CONTRL EXCITE RA 1200PSI 48IN

## (undated) DEVICE — GLV SURG BIOGEL LTX PF 7 1/2

## (undated) DEVICE — DECANTER BAG 9": Brand: MEDLINE INDUSTRIES, INC.

## (undated) DEVICE — CVR HNDL LT SURG ACCSSRY BLU STRL

## (undated) DEVICE — GLIDEX™ COATED HYDROPHILIC GUIDEWIRE: Brand: MAGIC TORQUE™

## (undated) DEVICE — PK ANGIO OR 10

## (undated) DEVICE — AVANTI + 4F STD W/GW: Brand: AVANTI